# Patient Record
(demographics unavailable — no encounter records)

---

## 2025-02-21 NOTE — PHYSICAL EXAM
[Clear Rhinorrhea] : clear rhinorrhea [Erythematous Oropharynx] : erythematous oropharynx [NL] : warm, clear [Enlarged Tonsils] : tonsils not enlarged [Exudate] : no exudate [FreeTextEntry4] : nasal congestion

## 2025-02-21 NOTE — HISTORY OF PRESENT ILLNESS
[de-identified] : sore throat [FreeTextEntry6] : 6y4m old boy BIB mother with c/o runny nose, congestion, sore throat and temp of 99 since last night. Sister with influenza 2 weeks ago. No SOB, difficulty breathing, chest pain, stridor or wheeze. No n/v/d. No headache, abdominal pain, or rash. No body aches or fatigue. No urinary complaints. Good po/uop/bm. Normal sleep and activity.

## 2025-02-21 NOTE — DISCUSSION/SUMMARY
[FreeTextEntry1] : Anticipatory guidance and parent education given. Rapid Flu test negative in office. Rapid strep test negative in office, throat culture sent to lab. Will follow up by phone and initiate antibiotic treatment if throat culture results are positive. Symptoms likely due to viral URI. May use Tylenol or Ibuprofen as needed for fever or discomfort. Recommend supportive care including humidified air, increased fluids, rest, and nasal saline followed by nasal suction. Return if symptoms worsen or persist.

## 2025-02-21 NOTE — REVIEW OF SYSTEMS
[Nasal Discharge] : nasal discharge [Nasal Congestion] : nasal congestion [Sore Throat] : sore throat [Cough] : cough [Negative] : Genitourinary [Headache] : no headache [Eye Discharge] : no eye discharge [Eye Redness] : no eye redness [Ear Pain] : no ear pain [Tachypnea] : not tachypneic [Wheezing] : no wheezing [Shortness of Breath] : no shortness of breath

## 2025-03-04 NOTE — HISTORY OF PRESENT ILLNESS
[de-identified] : sore throat/nausea [FreeTextEntry6] : BIB mother for sore throat and nausea x1 day. siblings at home with similar symptoms.  No medicine taken. No fever. No difficulty breathing, cough, congestion. No v/d. No rash. No fatigue. Good po/uop/bm. Normal sleep and activity.

## 2025-03-04 NOTE — PHYSICAL EXAM
[Erythematous Oropharynx] : erythematous oropharynx [Enlarged Tonsils] : tonsils not enlarged [Vesicles] : no vesicles [Exudate] : no exudate [Ulcerative Lesions] : no ulcerative lesions [Palate petechiae] : palate petechiae [NL] : warm, clear

## 2025-05-02 NOTE — DISCUSSION/SUMMARY
[FreeTextEntry1] : Anticipatory guidance and parent education given. Rapid strep test positive in office. Take oral antibiotics as prescribed. Use Tylenol or Ibuprofen as needed.  After being on antibiotics for at least 24 hours patient less likely to spread infection. Follow up as needed for persistent or worsening symptoms.  S/s appendicitis, GI emergency discussed at length. Mother to take patient to ER for concerns, mother understands plan Follow up if symptoms persist or worsen.

## 2025-05-02 NOTE — PHYSICAL EXAM
[Erythematous Oropharynx] : erythematous oropharynx [Enlarged Tonsils] : enlarged tonsils [NL] : warm, clear [Vesicles] : no vesicles [Exudate] : no exudate [Ulcerative Lesions] : no ulcerative lesions [Palate petechiae] : palate without petechiae [Tenderness with Palpation] : no tenderness with palpation [Splenomegaly] : no splenomegaly [Hepatomegaly] : no hepatomegaly [Mass] : no mass palpable [McBurney's point tenderness] : no McBurney's point tenderness [Rebound tenderness] : no rebound tenderness [Psoas Sign Positive] : psoas sign negative [Obturator Sign Positive] : obturator sign negative [de-identified] : tonsils +2

## 2025-05-02 NOTE — HISTORY OF PRESENT ILLNESS
[de-identified] : intermittent stomach pain [FreeTextEntry6] : BIB mother for stomach pain. mother reports that patient woke up this morning ~ 1:30 am with c/o RLQ stomach pain that eventually dissipated. Started c/o again around breakfast time. has since been asymptomatic.  mom reports two loose stools yesterday. eating, drinking normally. No medicine taken today. No fever. No SOB, difficulty breathing, chest pain, cough, congestion or URI sx. No n/v/d. No headache, sore throat or rash. No body aches or fatigue. Good po/uop/bm. Normal sleep and activity.

## 2025-06-11 NOTE — PHYSICAL EXAM
[Alert] : alert [No Acute Distress] : no acute distress [Normocephalic] : normocephalic [Conjunctivae with no discharge] : conjunctivae with no discharge [PERRL] : PERRL [EOMI Bilateral] : EOMI bilateral [Auricles Well Formed] : auricles well formed [Clear Tympanic membranes with present light reflex and bony landmarks] : clear tympanic membranes with present light reflex and bony landmarks [No Discharge] : no discharge [Nares Patent] : nares patent [Pink Nasal Mucosa] : pink nasal mucosa [Palate Intact] : palate intact [Nonerythematous Oropharynx] : nonerythematous oropharynx [Supple, full passive range of motion] : supple, full passive range of motion [No Palpable Masses] : no palpable masses [Symmetric Chest Rise] : symmetric chest rise [Clear to Auscultation Bilaterally] : clear to auscultation bilaterally [Regular Rate and Rhythm] : regular rate and rhythm [Normal S1, S2 present] : normal S1, S2 present [No Murmurs] : no murmurs [+2 Femoral Pulses] : +2 femoral pulses [Soft] : soft [NonTender] : non tender [Non Distended] : non distended [Normoactive Bowel Sounds] : normoactive bowel sounds [No Hepatomegaly] : no hepatomegaly [No Splenomegaly] : no splenomegaly [No Abnormal Lymph Nodes Palpated] : no abnormal lymph nodes palpated [No Gait Asymmetry] : no gait asymmetry [No pain or deformities with palpation of bone, muscles, joints] : no pain or deformities with palpation of bone, muscles, joints [Normal Muscle Tone] : normal muscle tone [Straight] : straight [+2 Patella DTR] : +2 patella DTR [Cranial Nerves Grossly Intact] : cranial nerves grossly intact [No Rash or Lesions] : no rash or lesions [FreeTextEntry6] : deferred [de-identified] : deferred

## 2025-06-11 NOTE — DEVELOPMENTAL MILESTONES
[Normal Development] : Normal Development [None] : none [Cuts most foods with a knife] : cuts most foods with a knife [Ties shoes] : ties shoes [Is dry day and night] : is dry day and night [Chooses preferred foods] : chooses preferred foods [Starts/continues conversation with peers] : starts/continues conversation with peers [Plays and interacts with at least one] : plays and interacts with at least one "best friend" [Masters all consonant sounds and] : masters all consonant sounds and combinations, such as "d" or "ch" [Counts 10 objects] : counts 10 objects [Can do simple addition and] : can do simple addition and subtraction with objects [Hops on one foot 3 to 4 times] : hops on one foot 3 to 4 times [Catches small ball with] : catches small ball with 2 hands [Prints 3 or more simple words] : prints 3 or more simple words without copying [Writes first and last name in] : writes first and last name in uppercase or lowercase letters

## 2025-06-11 NOTE — HISTORY OF PRESENT ILLNESS
[Mother] : mother [Vitamin] : Patient takes vitamin daily [Normal] : Normal [In own bed] : In own bed [Brushing teeth] : Brushing teeth [Yes] : Patient goes to dentist yearly [Toothpaste] : Primary Fluoride Source: Toothpaste [Playtime (60 min/d)] : Playtime 60 min a day [< 2 hrs of screen time] : Less than 2 hrs of screen time [Appropiate parent-child-sibling interaction] : Appropriate parent-child-sibling interaction [Child Cooperates] : Child cooperates [Parent has appropriate responses to behavior] : Parent has appropriate responses to behavior [Grade ___] : Grade [unfilled] [No difficulties with Homework] : No difficulties with homework [Adequate attention] : Adequate attention [Adequate performance] : Adequate performance [No] : Not at  exposure [Water heater temperature set at <120 degrees F] : Water heater temperature set at <120 degrees F [Car seat in back seat] : Car seat in back seat [Carbon Monoxide Detectors] : Carbon monoxide detectors [Smoke Detectors] : Smoke detectors [Supervised outdoor play] : Supervised outdoor play [Up to date] : Up to date [NO] : No [Exposure to electronic nicotine delivery system] : No exposure to electronic nicotine delivery system [FreeTextEntry7] : doing well [de-identified] : none [de-identified] : well balanced

## 2025-06-11 NOTE — DISCUSSION/SUMMARY
[Full Activity without restrictions including Physical Education & Athletics] : Full Activity without restrictions including Physical Education & Athletics [FreeTextEntry1] : Continue balanced diet with all food groups. Brush teeth twice a day with toothbrush. Recommend visit to dentist. Help child to maintain consistent daily routines and sleep schedule. School discussed. Ensure home is safe. Teach child about personal safety. Use consistent, positive discipline. Limit screen time to no more than 2 hours per day. Encourage physical activity. Child needs to ride in a belt-positioning booster seat until 4 feet 9 inches has been reached and are between 8 and 12 years of age.   Return 1 year for routine well child check.  Routine labs ordered - will follow up when results available.